# Patient Record
Sex: MALE | Race: WHITE | Employment: FULL TIME | URBAN - NONMETROPOLITAN AREA
[De-identification: names, ages, dates, MRNs, and addresses within clinical notes are randomized per-mention and may not be internally consistent; named-entity substitution may affect disease eponyms.]

---

## 2023-11-20 ENCOUNTER — OFFICE VISIT (OUTPATIENT)
Dept: URGENT CARE | Facility: MEDICAL CENTER | Age: 33
End: 2023-11-20
Payer: COMMERCIAL

## 2023-11-20 VITALS
HEIGHT: 74 IN | OXYGEN SATURATION: 98 % | WEIGHT: 209.2 LBS | TEMPERATURE: 98.1 F | DIASTOLIC BLOOD PRESSURE: 86 MMHG | RESPIRATION RATE: 20 BRPM | BODY MASS INDEX: 26.85 KG/M2 | SYSTOLIC BLOOD PRESSURE: 134 MMHG | HEART RATE: 82 BPM

## 2023-11-20 DIAGNOSIS — K12.2 UVULITIS: Primary | ICD-10-CM

## 2023-11-20 PROCEDURE — 99212 OFFICE O/P EST SF 10 MIN: CPT | Performed by: PHYSICIAN ASSISTANT

## 2023-11-20 RX ORDER — AMOXICILLIN AND CLAVULANATE POTASSIUM 875; 125 MG/1; MG/1
1 TABLET, FILM COATED ORAL 2 TIMES DAILY
Qty: 14 TABLET | Refills: 0 | Status: SHIPPED | OUTPATIENT
Start: 2023-11-20 | End: 2023-11-27

## 2023-11-20 RX ORDER — METHYLPREDNISOLONE 4 MG/1
TABLET ORAL
Qty: 1 EACH | Refills: 0 | Status: SHIPPED | OUTPATIENT
Start: 2023-11-20

## 2023-11-20 NOTE — PROGRESS NOTES
[facility]        NAME: Ruma Rios is a 28 y.o. male  : 1990    MRN: [id number]  DATE: 2023  TIME: 9:02 AM    Assessment and Plan   Uvulitis [K12.2]  1. Uvulitis  amoxicillin-clavulanate (AUGMENTIN) 875-125 mg per tablet    methylPREDNISolone 4 MG tablet therapy pack            Patient Instructions     Tart Augmentin and take with food  Probiotic and yogurt to replace the good bacteria in your gut  Tea with honey  Salt water gargles  Tylenol or Ibuprofen as needed for pain  Vitamin C drops  If symptoms worsen have yourself rechecked     Follow up with PCP in 3-5 days. Proceed to  ER if symptoms worsen. Chief Complaint     Chief Complaint   Patient presents with   • Cold Like Symptoms     Patient states he has been having cold symptoms since last week and has some swelling in the back of his throat. Main symptoms are sinus congestion as well as pain in the back of his neck. Patient taking Robitussin and Motrin at home to treat with minimal effect. History of Present Illness       Patient presents with a 1 week history of cold-like symptoms. Sore throat  is worse and states his uvula is touch his tongue. Review of Systems   Review of Systems   Constitutional:  Negative for fever. HENT:  Positive for congestion and sore throat. Negative for rhinorrhea. Respiratory:  Positive for cough. Gastrointestinal:  Negative for diarrhea, nausea and vomiting. Musculoskeletal:  Negative for myalgias. Neurological:  Positive for headaches.          Current Medications       Current Outpatient Medications:   •  amoxicillin-clavulanate (AUGMENTIN) 875-125 mg per tablet, Take 1 tablet by mouth 2 (two) times a day for 7 days, Disp: 14 tablet, Rfl: 0  •  methylPREDNISolone 4 MG tablet therapy pack, Use as directed on package, Disp: 1 each, Rfl: 0    Current Allergies     Allergies as of 2023   • (No Known Allergies)            The following portions of the patient's history were reviewed and updated as appropriate: allergies, current medications, past family history, past medical history, past social history, past surgical history and problem list.     History reviewed. No pertinent past medical history. History reviewed. No pertinent surgical history. History reviewed. No pertinent family history. Medications have been verified. Objective   /86   Pulse 82   Temp 98.1 °F (36.7 °C) (Temporal)   Resp 20   Ht 6' 2" (1.88 m)   Wt 94.9 kg (209 lb 3.2 oz)   SpO2 98%   BMI 26.86 kg/m²   No LMP for male patient. Physical Exam     Physical Exam  Vitals and nursing note reviewed. Constitutional:       Appearance: Normal appearance. HENT:      Head: Normocephalic and atraumatic. Right Ear: Tympanic membrane normal.      Left Ear: Tympanic membrane normal.      Mouth/Throat:      Mouth: Mucous membranes are moist.      Comments: Erythema and swelling of uvula which touches tongue  Eyes:      Conjunctiva/sclera: Conjunctivae normal.   Cardiovascular:      Rate and Rhythm: Normal rate and regular rhythm. Heart sounds: Normal heart sounds. Pulmonary:      Effort: Pulmonary effort is normal.      Breath sounds: Normal breath sounds. Musculoskeletal:      Cervical back: Neck supple. Lymphadenopathy:      Cervical: No cervical adenopathy. Skin:     General: Skin is warm. Neurological:      Mental Status: He is alert.

## 2023-11-20 NOTE — PATIENT INSTRUCTIONS
Tart Augmentin and take with food  Probiotic and yogurt to replace the good bacteria in your gut  Tea with honey  Salt water gargles  Tylenol or Ibuprofen as needed for pain  Vitamin C drops  If symptoms worsen have yourself rechecked

## 2024-10-14 ENCOUNTER — OFFICE VISIT (OUTPATIENT)
Dept: URGENT CARE | Facility: CLINIC | Age: 34
End: 2024-10-14
Payer: COMMERCIAL

## 2024-10-14 ENCOUNTER — APPOINTMENT (OUTPATIENT)
Dept: RADIOLOGY | Facility: CLINIC | Age: 34
End: 2024-10-14
Payer: COMMERCIAL

## 2024-10-14 VITALS
RESPIRATION RATE: 18 BRPM | DIASTOLIC BLOOD PRESSURE: 97 MMHG | SYSTOLIC BLOOD PRESSURE: 158 MMHG | TEMPERATURE: 102 F | OXYGEN SATURATION: 96 % | HEART RATE: 116 BPM

## 2024-10-14 DIAGNOSIS — J18.9 PNEUMONIA OF RIGHT LOWER LOBE DUE TO INFECTIOUS ORGANISM: Primary | ICD-10-CM

## 2024-10-14 DIAGNOSIS — R50.9 FEVER, UNSPECIFIED FEVER CAUSE: ICD-10-CM

## 2024-10-14 PROCEDURE — 99213 OFFICE O/P EST LOW 20 MIN: CPT

## 2024-10-14 PROCEDURE — 71046 X-RAY EXAM CHEST 2 VIEWS: CPT

## 2024-10-14 RX ORDER — AMOXICILLIN 500 MG/1
1000 CAPSULE ORAL EVERY 8 HOURS SCHEDULED
Qty: 42 CAPSULE | Refills: 0 | Status: SHIPPED | OUTPATIENT
Start: 2024-10-14 | End: 2024-10-21

## 2024-10-14 RX ORDER — ALBUTEROL SULFATE 90 UG/1
2 INHALANT RESPIRATORY (INHALATION) EVERY 6 HOURS PRN
Qty: 8.5 G | Refills: 0 | Status: SHIPPED | OUTPATIENT
Start: 2024-10-14

## 2024-10-14 RX ORDER — BENZONATATE 200 MG/1
200 CAPSULE ORAL 3 TIMES DAILY PRN
Qty: 20 CAPSULE | Refills: 0 | Status: SHIPPED | OUTPATIENT
Start: 2024-10-14

## 2024-10-14 NOTE — PATIENT INSTRUCTIONS
Pneumonia on xray  Start the antibiotic . Use inhaler as needed  Cough medication as needed  You have been prescribed an antibiotic.  Take antibiotic as directed for the full duration.  Do not stop the antibiotics just because you are feeling better.  Side effects of antibiotics include diarrhea.  Eat yogurt or take a probiotic or acidophilus tablet while taking this medication to help prevent diarrhea and replenish good gut bacteria.

## 2024-10-14 NOTE — PROGRESS NOTES
[facility]        NAME: Tomas Harris is a 34 y.o. male  : 1990    MRN: [id number]  DATE: 2024  TIME: 3:28 PM    Assessment and Plan   Pneumonia of right lower lobe due to infectious organism [J18.9]  1. Pneumonia of right lower lobe due to infectious organism  XR chest pa and lateral    amoxicillin (AMOXIL) 500 mg capsule    benzonatate (TESSALON) 200 MG capsule    albuterol (ProAir HFA) 90 mcg/act inhaler        Cxr- RLL infiltrate.  Will treat with amoxicillin  Tessalon for cough  Albuterol for sob/wheezing    Patient Instructions   Pneumonia on xray  Start the antibiotic . Use inhaler as needed  Cough medication as needed  You have been prescribed an antibiotic.  Take antibiotic as directed for the full duration.  Do not stop the antibiotics just because you are feeling better.  Side effects of antibiotics include diarrhea.  Eat yogurt or take a probiotic or acidophilus tablet while taking this medication to help prevent diarrhea and replenish good gut bacteria.      Follow up with PCP in 3-5 days.  Proceed to  ER if symptoms worsen.    Chief Complaint     Chief Complaint   Patient presents with    Fever     X 5 days cough, chills, sweats     Cough    Chills         History of Present Illness       Patient is a 34 year old male who presents to the office today for a fever for 5 days. Also chills, fatigue, sweats, cough. Is using tylenol and motrin as needed and still not keeping fever down.     Fever  Associated symptoms include chills, congestion, coughing, diaphoresis, fatigue and a fever. Pertinent negatives include no chest pain.   Cough  Associated symptoms include chills and a fever. Pertinent negatives include no chest pain, shortness of breath or wheezing.       Review of Systems   Review of Systems   Constitutional:  Positive for appetite change, chills, diaphoresis, fatigue and fever.   HENT:  Positive for congestion.    Respiratory:  Positive for cough. Negative  for shortness of breath and wheezing.    Cardiovascular:  Negative for chest pain and palpitations.   All other systems reviewed and are negative.        Current Medications       Current Outpatient Medications:     albuterol (ProAir HFA) 90 mcg/act inhaler, Inhale 2 puffs every 6 (six) hours as needed for wheezing or shortness of breath, Disp: 8.5 g, Rfl: 0    amoxicillin (AMOXIL) 500 mg capsule, Take 2 capsules (1,000 mg total) by mouth every 8 (eight) hours for 7 days, Disp: 42 capsule, Rfl: 0    benzonatate (TESSALON) 200 MG capsule, Take 1 capsule (200 mg total) by mouth 3 (three) times a day as needed for cough, Disp: 20 capsule, Rfl: 0    methylPREDNISolone 4 MG tablet therapy pack, Use as directed on package, Disp: 1 each, Rfl: 0    Current Allergies     Allergies as of 10/14/2024    (No Known Allergies)            The following portions of the patient's history were reviewed and updated as appropriate: allergies, current medications, past family history, past medical history, past social history, past surgical history and problem list.     History reviewed. No pertinent past medical history.    History reviewed. No pertinent surgical history.    History reviewed. No pertinent family history.      Medications have been verified.        Objective   /97   Pulse (!) 116   Temp (!) 102 °F (38.9 °C)   Resp 18   SpO2 96%        Physical Exam     Physical Exam  Vitals and nursing note reviewed.   Constitutional:       General: He is not in acute distress.     Appearance: He is ill-appearing. He is not toxic-appearing.   HENT:      Right Ear: Tympanic membrane normal.      Left Ear: Tympanic membrane normal.      Nose: Nose normal.      Mouth/Throat:      Mouth: Mucous membranes are moist.   Cardiovascular:      Rate and Rhythm: Regular rhythm. Tachycardia present.      Pulses: Normal pulses.      Heart sounds: Normal heart sounds.   Pulmonary:      Breath sounds: Normal breath sounds.   Skin:     General:  Skin is warm.      Capillary Refill: Capillary refill takes less than 2 seconds.   Neurological:      Mental Status: He is alert.